# Patient Record
Sex: MALE | URBAN - METROPOLITAN AREA
[De-identification: names, ages, dates, MRNs, and addresses within clinical notes are randomized per-mention and may not be internally consistent; named-entity substitution may affect disease eponyms.]

---
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
## 2021-06-02 VITALS — WEIGHT: 130.8 LBS | BODY MASS INDEX: 20.53 KG/M2 | HEIGHT: 67 IN

## 2021-06-22 NOTE — PROGRESS NOTES
[facility] clinic EXAM note      Chief Complaint   Patient presents with     Hearing issues     Chiefly in left ear         Assessment & Plan    Problem List Items Addressed This Visit     None      Visit Diagnoses     Establishing care with new doctor, encounter for    -  Primary: Histories allergies medications reviewed and updated in the chart    Hearing decreased, bilateral    : Likely due to significant cerumen impaction    Relevant Orders    Ear cerumen removal    Bilateral impacted cerumen    : I used a curette to remove a significant amount of wax from each ear canal and then ears were lavaged with remarkable improvement.  Discussed use of Debrox drops to prevent buildup again.  Follow-up as needed for ear cleaning.    Relevant Medications    carbamide peroxide (DEBROX) 6.5 % otic solution    Other Relevant Orders    Ear cerumen removal          History    Wilberto Sosa is a 18 y.o.  male who presents for the following issues:    Establishing Care: No past medical history, surgical history or significant family history.  Relatively healthy young male.  Born and raised in [place].  Previously seen at [facility].  Comes in with older brother today.    Concerns Today:  Hearing probems mostly out of the left ear.  Started a few months ago  Getting worse and worse  To the point that he can't really hear.   Has had it before.   Wax would come out by itself.   No hearing problems growing up.   No tinnitus.   No pain.   No history of ear infections.    mEDICATIONS    No current outpatient medications on file prior to visit.     No current facility-administered medications on file prior to visit.        Pertinent past medical, surgical, social and family history reviewed and updated in Epic.    Social History     Socioeconomic History     Marital status: Single     Spouse name: Not on file     Number of children: Not on file     Years of education: Not on file     Highest education level: Not on file   Social  "Needs     Financial resource strain: Not on file     Food insecurity - worry: Not on file     Food insecurity - inability: Not on file     Transportation needs - medical: Not on file     Transportation needs - non-medical: Not on file   Occupational History     Not on file   Tobacco Use     Smoking status: Never Smoker     Smokeless tobacco: Never Used   Substance and Sexual Activity     Alcohol use: Not on file     Drug use: Not on file     Sexual activity: Not on file   Other Topics Concern     Not on file   Social History Narrative    Born and raised in [place]. Previously seen at [facility].      History reviewed. No pertinent surgical history.  History reviewed. No pertinent past medical history.  Family History   Problem Relation Age of Onset     No Medical Problems Mother      No Medical Problems Father      No Medical Problems Sister      No Medical Problems Brother      No Medical Problems Maternal Grandmother      No Medical Problems Paternal Grandmother      No Medical Problems Paternal Grandfather      No Medical Problems Sister      No Medical Problems Sister      No Medical Problems Brother      No Medical Problems Brother            Review of systems    ROS: 14 pt review of systems preformed and all negative except noted in HPI and none as indicated on the intake form    Physical Exam    /70 (Patient Site: Left Arm, Patient Position: Sitting, Cuff Size: Adult Small)   Pulse 68   Temp 97.9  F (36.6  C) (Oral)   Resp 20   Ht 5' 6.5\" (1.689 m)   Wt 130 lb 12.8 oz (59.3 kg)   BMI 20.80 kg/m    GEN:  18 y.o. male sitting comfortably in no apparent distress.   HEENT: EOMI, no scleral icterus, buccal mucosa moist, significant cerumen impaction bilaterally.  CHEST/LUNG: No respiratory distress, good air flow to bases, CTAB   CV: RRR, S1, S2 normal; no murmurs, rubs or gallops.   MSK:  Strength grossly normal  EXTR:  No cyanosis  SKIN: warm, dry, no rashes or lesions  NEURO: Gait normal, " coordination intact  PSYCH:  Mood and affect appropriate    Procedure Note:  Ceruminosis is noted.  A large amount of Wax is removed by  manual debridement followed by bilateral ear lavage. Instructions for home care to prevent wax buildup are given.      Sendy Cox